# Patient Record
Sex: MALE | Race: WHITE | NOT HISPANIC OR LATINO | Employment: UNEMPLOYED | URBAN - METROPOLITAN AREA
[De-identification: names, ages, dates, MRNs, and addresses within clinical notes are randomized per-mention and may not be internally consistent; named-entity substitution may affect disease eponyms.]

---

## 2023-06-16 ENCOUNTER — OFFICE VISIT (OUTPATIENT)
Dept: CARDIOLOGY | Facility: CLINIC | Age: 58
End: 2023-06-16
Payer: MEDICAID

## 2023-06-16 VITALS
SYSTOLIC BLOOD PRESSURE: 122 MMHG | DIASTOLIC BLOOD PRESSURE: 78 MMHG | HEART RATE: 71 BPM | HEIGHT: 70 IN | WEIGHT: 299 LBS | BODY MASS INDEX: 42.8 KG/M2

## 2023-06-16 DIAGNOSIS — I42.8 NON-ISCHEMIC CARDIOMYOPATHY: Primary | ICD-10-CM

## 2023-06-16 RX ORDER — DULOXETIN HYDROCHLORIDE 30 MG/1
30 CAPSULE, DELAYED RELEASE ORAL DAILY
COMMUNITY

## 2023-06-16 RX ORDER — MELOXICAM 15 MG/1
15 TABLET ORAL DAILY
COMMUNITY

## 2023-06-16 RX ORDER — SPIRONOLACTONE 25 MG/1
25 TABLET ORAL DAILY
COMMUNITY

## 2023-06-16 RX ORDER — METOPROLOL SUCCINATE 100 MG/1
100 TABLET, EXTENDED RELEASE ORAL DAILY
COMMUNITY

## 2023-06-16 RX ORDER — LACOSAMIDE 200 MG/1
200 TABLET ORAL EVERY 12 HOURS SCHEDULED
COMMUNITY

## 2023-06-16 RX ORDER — DAPAGLIFLOZIN 10 MG/1
1 TABLET, FILM COATED ORAL DAILY
COMMUNITY

## 2023-06-16 RX ORDER — IPRATROPIUM BROMIDE AND ALBUTEROL SULFATE 2.5; .5 MG/3ML; MG/3ML
SOLUTION RESPIRATORY (INHALATION)
COMMUNITY
Start: 2023-04-03 | End: 2023-06-16

## 2023-06-16 RX ORDER — SACUBITRIL AND VALSARTAN 49; 51 MG/1; MG/1
1 TABLET, FILM COATED ORAL 2 TIMES DAILY
COMMUNITY
Start: 2023-04-05

## 2023-06-16 RX ORDER — FUROSEMIDE 40 MG/1
40 TABLET ORAL 2 TIMES DAILY
COMMUNITY

## 2023-06-16 RX ORDER — TIOTROPIUM BROMIDE AND OLODATEROL 3.124; 2.736 UG/1; UG/1
SPRAY, METERED RESPIRATORY (INHALATION) DAILY
COMMUNITY
Start: 2023-04-03

## 2023-06-16 NOTE — PROGRESS NOTES
"Date of Office Visit: 2023  Encounter Provider: Eldon Alvarenga MD  Place of Service: [facility] CARDIOLOGY  Patient Name: Raghu Chavez  : 1965    Subjective:     Encounter Date:2023      Patient ID: Raghu Chavez is a 58 y.o. male who has a cc of      The patient had a good year.   No anginal chest pain,   No sig sen,   No soa,   No fainting,  No orthostasis.   No edema.   Exercise tolerance:     There have been no hospital admission since the last visit.     There have been no bleeding events.       No past medical history on file.    Social History     Socioeconomic History    Marital status:        No family history on file.    Review of Systems   Constitutional: Negative for fever and night sweats.   HENT:  Negative for ear pain and stridor.    Eyes:  Negative for discharge and visual halos.   Cardiovascular:  Negative for cyanosis.   Respiratory:  Negative for hemoptysis and sputum production.    Hematologic/Lymphatic: Negative for adenopathy.   Skin:  Negative for nail changes and unusual hair distribution.   Musculoskeletal:  Negative for gout and joint swelling.   Gastrointestinal:  Negative for bowel incontinence and flatus.   Genitourinary:  Negative for dysuria and flank pain.   Neurological:  Negative for seizures and tremors.   Psychiatric/Behavioral:  Negative for altered mental status. The patient is not nervous/anxious.           Objective:     Vitals:    23 1304   BP: 122/78   Pulse: 71   Weight: 136 kg (299 lb)   Height: 177.8 cm (70\")         Eyes:      General:         Right eye: No discharge.         Left eye: No discharge.   HENT:      Head: Normocephalic and atraumatic.   Neck:      Thyroid: No thyromegaly.      Vascular: No JVD.   Pulmonary:      Effort: Pulmonary effort is normal.      Breath sounds: Normal breath sounds. No rales.   Cardiovascular:      Normal rate. Regular rhythm.      S3 No gallop.    Edema:     Peripheral edema " Left a detailed message on voicemail   absent.   Abdominal:      General: Bowel sounds are normal.      Palpations: Abdomen is soft.      Tenderness: There is no abdominal tenderness.   Musculoskeletal: Normal range of motion.         General: No deformity. Skin:     General: Skin is warm and dry.      Findings: No erythema.   Neurological:      Mental Status: Alert and oriented to person, place, and time.      Motor: Normal muscle tone.   Psychiatric:         Behavior: Behavior normal.         Thought Content: Thought content normal.         ECG 12 Lead    Date/Time: 6/16/2023 1:46 PM  Performed by: Eldon Alvarenga MD  Authorized by: Eldon Alvarenga MD   Comparison: compared with previous ECG   Similar to previous ECG  Rhythm: sinus rhythm  Conduction: non-specific intraventricular conduction delay  Other findings: non-specific ST-T wave changes        Lab Review:       Assessment:          Diagnosis Plan   1. Non-ischemic cardiomyopathy  ECG 12 Lead             Plan:     We had a long discussion.    He does not have a wide QRS.    I do not believe he is a good candidate for primary prevention ICD and I do not think he would benefit from a CRT.    Obviously he has various challenging social situations as [place] in [facility].

## 2023-06-16 NOTE — PROGRESS NOTES
"Date of Office Visit: 2023  Encounter Provider: Eldon Alvarenga MD  Place of Service: [facility] CARDIOLOGY  Patient Name: Raghu Chavez  : 1965    Subjective:     Encounter Date:2023      Patient ID: Raghu Chavez is a 58 y.o. male who has a cc of  NICM and he is homeless. 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 (He has had interventions)     He complains of TRACY, SOA, edema.     He is also very irritable and he doesn't know why    Bones and joints are sore all the time -- \"perhaps from sleeping outside\" Hips and knees.       No past medical history on file.    Social History     Socioeconomic History    Marital status:        No family history on file.    Review of Systems   Constitutional: Negative for fever and night sweats.   HENT:  Negative for ear pain and stridor.    Eyes:  Negative for discharge and visual halos.   Cardiovascular:  Negative for cyanosis.   Respiratory:  Negative for hemoptysis and sputum production.    Hematologic/Lymphatic: Negative for adenopathy.   Skin:  Negative for nail changes and unusual hair distribution.   Musculoskeletal:  Positive for arthritis and joint pain. Negative for gout and joint swelling.   Gastrointestinal:  Negative for bowel incontinence and flatus.   Genitourinary:  Negative for dysuria and flank pain.   Neurological:  Negative for seizures and tremors.   Psychiatric/Behavioral:  Negative for altered mental status. The patient is not nervous/anxious.           Objective:     Vitals:    23 1304   BP: 122/78   Pulse: 71   Weight: 136 kg (299 lb)   Height: 177.8 cm (70\")         Eyes:      General:         Right eye: No discharge.         Left eye: No discharge.   HENT:      Head: Normocephalic and atraumatic.   Neck:      Thyroid: No thyromegaly.      Vascular: No JVD.   Pulmonary:      Effort: Pulmonary effort is normal.      Breath sounds: Normal breath sounds. No rales. "   Cardiovascular:      Normal rate. Regular rhythm.      S3 Gallop.    Edema:     Peripheral edema absent.   Abdominal:      General: Bowel sounds are normal.      Palpations: Abdomen is soft.      Tenderness: There is no abdominal tenderness.   Musculoskeletal: Normal range of motion.         General: No deformity. Skin:     General: Skin is warm and dry.      Findings: No erythema.   Neurological:      Mental Status: Alert and oriented to person, place, and time.      Motor: Normal muscle tone.   Psychiatric:         Behavior: Behavior normal.         Thought Content: Thought content normal.         ECG 12 Lead    Date/Time: 6/16/2023 1:38 PM  Performed by: Eldon Alvarenga MD  Authorized by: Eldon Alvarenga MD   Comparison: compared with previous ECG   Similar to previous ECG  Rhythm: sinus rhythm  Conduction: non-specific intraventricular conduction delay  Other findings: non-specific ST-T wave changes        Lab Review:       Assessment:         No diagnosis found.       Plan: